# Patient Record
Sex: FEMALE | Race: WHITE | NOT HISPANIC OR LATINO | Employment: OTHER | URBAN - METROPOLITAN AREA
[De-identification: names, ages, dates, MRNs, and addresses within clinical notes are randomized per-mention and may not be internally consistent; named-entity substitution may affect disease eponyms.]

---

## 2021-07-27 ENCOUNTER — DOCUMENTATION ONLY (OUTPATIENT)
Dept: OTHER | Facility: CLINIC | Age: 76
End: 2021-07-27

## 2024-08-07 ENCOUNTER — DOCUMENTATION ONLY (OUTPATIENT)
Dept: GERIATRICS | Facility: CLINIC | Age: 79
End: 2024-08-07
Payer: COMMERCIAL

## 2024-08-08 ENCOUNTER — TRANSITIONAL CARE UNIT VISIT (OUTPATIENT)
Dept: GERIATRICS | Facility: CLINIC | Age: 79
End: 2024-08-08
Payer: COMMERCIAL

## 2024-08-08 VITALS
OXYGEN SATURATION: 94 % | BODY MASS INDEX: 33.38 KG/M2 | SYSTOLIC BLOOD PRESSURE: 161 MMHG | HEART RATE: 93 BPM | HEIGHT: 60 IN | TEMPERATURE: 97.1 F | WEIGHT: 170 LBS | RESPIRATION RATE: 18 BRPM | DIASTOLIC BLOOD PRESSURE: 79 MMHG

## 2024-08-08 DIAGNOSIS — F41.1 GENERALIZED ANXIETY DISORDER: ICD-10-CM

## 2024-08-08 DIAGNOSIS — G45.9 TIA (TRANSIENT ISCHEMIC ATTACK): ICD-10-CM

## 2024-08-08 DIAGNOSIS — N18.2 CKD (CHRONIC KIDNEY DISEASE) STAGE 2, GFR 60-89 ML/MIN: ICD-10-CM

## 2024-08-08 DIAGNOSIS — Z86.73 HISTORY OF CVA (CEREBROVASCULAR ACCIDENT): ICD-10-CM

## 2024-08-08 DIAGNOSIS — F33.42 RECURRENT MAJOR DEPRESSIVE DISORDER, IN FULL REMISSION (H): ICD-10-CM

## 2024-08-08 DIAGNOSIS — E03.9 ACQUIRED HYPOTHYROIDISM: ICD-10-CM

## 2024-08-08 DIAGNOSIS — I10 PRIMARY HYPERTENSION: ICD-10-CM

## 2024-08-08 DIAGNOSIS — U07.1 INFECTION DUE TO 2019 NOVEL CORONAVIRUS: Primary | ICD-10-CM

## 2024-08-08 DIAGNOSIS — M62.81 GENERALIZED MUSCLE WEAKNESS: ICD-10-CM

## 2024-08-08 DIAGNOSIS — R73.01 IMPAIRED FASTING GLUCOSE: ICD-10-CM

## 2024-08-08 PROCEDURE — 99309 SBSQ NF CARE MODERATE MDM 30: CPT | Performed by: NURSE PRACTITIONER

## 2024-08-08 RX ORDER — CLOPIDOGREL BISULFATE 75 MG/1
75 TABLET ORAL DAILY
COMMUNITY

## 2024-08-08 RX ORDER — ASPIRIN 81 MG/1
81 TABLET ORAL DAILY
COMMUNITY

## 2024-08-08 RX ORDER — SENNOSIDES A AND B 8.6 MG/1
1 TABLET, FILM COATED ORAL DAILY
COMMUNITY

## 2024-08-08 RX ORDER — ACETAMINOPHEN 325 MG/1
650 TABLET ORAL EVERY 6 HOURS PRN
COMMUNITY

## 2024-08-08 RX ORDER — NORTRIPTYLINE HYDROCHLORIDE 50 MG/1
50 CAPSULE ORAL AT BEDTIME
COMMUNITY

## 2024-08-08 RX ORDER — ASPIRIN 325 MG
325 TABLET, DELAYED RELEASE (ENTERIC COATED) ORAL DAILY
COMMUNITY
Start: 2024-08-29

## 2024-08-08 RX ORDER — LEVOTHYROXINE SODIUM 100 UG/1
100 TABLET ORAL DAILY
COMMUNITY

## 2024-08-08 RX ORDER — OMEPRAZOLE 40 MG/1
40 CAPSULE, DELAYED RELEASE ORAL DAILY
COMMUNITY

## 2024-08-08 RX ORDER — VENLAFAXINE HYDROCHLORIDE 150 MG/1
300 TABLET, EXTENDED RELEASE ORAL DAILY
COMMUNITY

## 2024-08-08 RX ORDER — TUBERCULIN PURIFIED PROTEIN DERIVATIVE 5 [IU]/.1ML
5 INJECTION, SOLUTION INTRADERMAL ONCE
COMMUNITY
End: 2024-08-19

## 2024-08-08 RX ORDER — ATORVASTATIN CALCIUM 40 MG/1
40 TABLET, FILM COATED ORAL AT BEDTIME
COMMUNITY

## 2024-08-08 RX ORDER — AMLODIPINE BESYLATE 5 MG/1
5 TABLET ORAL DAILY
COMMUNITY

## 2024-08-08 NOTE — PROGRESS NOTES
"                                                                              [facility]     Code Status:  FULL CODE  Visit Type: Hospital F/U     Facility:   [facility] (TCU) [[id number]]        History of Present Illness:   Hospital Admission Date: 7/28/2024   Hospital Discharge Date: 8/7/2024      Linda Cummings is a 79 year old female with a past medical history for hypothyroidisim, HTN, hx of CVA, anxiety disorder, depression and CKD.  She was recently hospitalized for encephalopathy.  She was thought to have TIA vs hypertensive urgency and metabolic encephalopathy 2/2 COVID 19.  She tested positive for COVID 19 on 7/29.  Her son (with whom she lives) stated that she tested negative on a home COVID test after 2 days of coughing and fatigue.  During hospitalization she had confusion and word finding, which cleared with conservative treatment.  She was also placed on TIA drug regimen of atorvastatin, ASA 81mg and plavix x 3 weeks.  Plan to transition to ASA 325mg and plavix after the 3 weeks.    Anxiety was an issue in the hospital and her nortriptyline was increased and she was treated with prn clonazepam as they were not able to get Rexulti (which she used at home).  Rexulti was resumed at discharge.      Today, She appears anxious and states she didn't sleep well last night as she was ruminating on the \"all the things that needed to be done\" at home. I assured her that her son was able to take care of the things at home and she needed to focus on therapy, sleep and nutrition to improve her weakness.  She denied any pain.  Some anxiety likely due to isolation which will be done tomorrow.      Past Medical History:   Diagnosis Date    Hypertension     just started med, has never been told she has HBP    Malignant neoplasm (H) 1995    lumpectomy,radiation- no trouble since    Thyroid disease 2002    started med     Past Surgical History:   Procedure Laterality Date    BIOPSY      " BREAST SURGERY      lump removed    EYE SURGERY  1964    lazy eye    GYN SURGERY          GYN SURGERY      complete hysterectomy    IR LUMBAR PUNCTURE  2017     Family History   Problem Relation Age of Onset    Depression Maternal Grandmother     Anxiety Disorder Maternal Aunt     Depression Maternal Aunt      Social History     Socioeconomic History    Marital status:      Spouse name: Not on file    Number of children: Not on file    Years of education: Not on file    Highest education level: Not on file   Occupational History    Not on file   Tobacco Use    Smoking status: Never    Smokeless tobacco: Not on file   Substance and Sexual Activity    Alcohol use: No    Drug use: No    Sexual activity: Not on file   Other Topics Concern    Parent/sibling w/ CABG, MI or angioplasty before 65F 55M? Not Asked   Social History Narrative    Not on file     Social Determinants of Health     Financial Resource Strain: Not on file   Food Insecurity: No Food Insecurity (2024)    Received from [employer]    Hunger Vital Sign     Worried About Running Out of Food in the Last Year: Never true     Ran Out of Food in the Last Year: Never true   Transportation Needs: No Transportation Needs (2024)    Received from [employer]    PRAPARE - Transportation     Lack of Transportation (Medical): No     Lack of Transportation (Non-Medical): No   Physical Activity: Not on file   Stress: Not on file   Social Connections: Not on file   Interpersonal Safety: Unknown (2024)    Received from [employer]    Humiliation, Afraid, Rape, and Kick questionnaire     Fear of Current or Ex-Partner: Not on file     Emotionally Abused: No     Physically Abused: No     Sexually Abused: No   Housing Stability: Low Risk  (2024)    Received from [employer]    Housing Stability Vital Sign     Unable to Pay for Housing in the Last Year: No     Number of Places Lived in the Last Year: 1     In  the last 12 months, was there a time when you did not have a steady place to sleep or slept in a shelter (including now)?: No       Current Outpatient Medications   Medication Sig Dispense Refill    acetaminophen (TYLENOL) 325 MG tablet Take 650 mg by mouth every 6 hours as needed for mild pain      amLODIPine (NORVASC) 5 MG tablet Take 5 mg by mouth daily      aspirin (ASA) 325 MG EC tablet Take 325 mg by mouth daily DO NOT START BEFORE 8/22/24      aspirin 81 MG EC tablet Take 81 mg by mouth daily      atorvastatin (LIPITOR) 40 MG tablet Take 40 mg by mouth at bedtime      brexpiprazole (REXULTI) 0.5 MG tablet Take 1 mg by mouth daily      clopidogrel (PLAVIX) 75 MG tablet Take 75 mg by mouth daily      EVISTA 60 MG OR TABS Take 60 mg by mouth daily      klonoPIN 0.25mg TABS Take 0.5 mg by mouth 2 times daily. AM and bedtime      levothyroxine (SYNTHROID/LEVOTHROID) 100 MCG tablet Take 100 mcg by mouth daily      nortriptyline (PAMELOR) 75 MG capsule Take 75 mg by mouth At Bedtime.      omeprazole (PRILOSEC) 40 MG DR capsule Take 40 mg by mouth daily Give an hour before a meal      QUEtiapine (SEROQUEL) 25 MG tablet Take 75 mg by mouth at bedtime      senna (SENOKOT) 8.6 MG tablet Take 1 tablet by mouth daily      TRAZODONE  mg at bedtime      tuberculin (TUBERSOL) 5 UNIT/0.1ML injection Inject 5 Units into the skin once      venlafaxine (EFFEXOR-ER) 150 MG 24 hr tablet Take 300 mg by mouth daily      CALCIUM CITRATE-MG CITRATE-D PO Take 1 tablet by mouth daily. 315-250 units      GEODON OR 1 CAPSULE TWICE DAILY      hydrOXYzine (VISTARIL) 25 MG capsule Take 25 mg by mouth 2 times daily as needed. anxiety      LAMICTAL OR 1 TABLET EVERY OTHER DAY      LEVOTHYROXINE SODIUM 100 mcg every morning (before breakfast).      LITHIUM BROMIDE None Entered      METFORMIN HCL None Entered      Multiple Vitamins-Minerals (CENTRUM SILVER PO) Take 1 tablet by mouth daily.      nadolol (CORGARD) 40 MG tablet Take 40 mg by  "mouth daily.      Omega-3 Fatty Acids (OMEGA 3 PO) Take 3 capsules by mouth daily.      REMERON OR 1 TABLET DAILY      VICODIN OR Take 5-500 mg by mouth At Bedtime. Neck and shoulder pain.       XANAX OR 1 TABLET 3 TIMES DAILY       Allergies   Allergen Reactions    Adenosine     Amoxicillin     Prednisone      Gets \"hyper\"     Immunization History   Administered Date(s) Administered    COVID-19 MONOVALENT 12+ (Pfizer) 04/24/2021, 05/15/2021         Post Discharge Medication Reconciliation Status: discharge medications reconciled, continue medications without change.    Medications list and allergies in the facility chart have been reviewed.  Please see facility EMR for most up to date list.         Review of Systems   Patient denies fever, chills, headache, lightheadedness, dizziness, rhinorrhea, cough, congestion, shortness of breath, chest pain, palpitations, abdominal pain, n/v, diarrhea, constipation, change in appetite, dysuria, frequency, burning or pain with urination.  Other than stated in HPI all other review of systems is negative.       Physical Exam  Vital signs:BP (!) 161/79   Pulse 93   Temp 97.1  F (36.2  C)   Resp 18   Ht 1.524 m (5')   Wt 77.1 kg (170 lb)   SpO2 94%   BMI 33.20 kg/m     GENERAL APPEARANCE: elderly female, anxious but in no acute distress.  HEENT: normocephalic, atraumatic   sclerae anicteric, conjunctivae clear and moist, EOM intact  LUNGS: Lung sounds CTA, no adventitious sounds, respiratory effort normal.  CARD: RRR, S1, S2, without murmurs, gallops, rubs  ABD: Soft, nondistended and nontender with normal bowel sounds.   MSK: Muscle strength and tone were equal bilaterally. Moves all extremities easily and intentionally.   EXTREMITIES: No cyanosis, clubbing or edema.  NEURO: Alert and oriented x 3.  Face is symmetric.  SKIN: Inspection of the skin reveals no rashes, ulcerations or petechiae.  PSYCH: euthymic        Labs:    Last Comprehensive Metabolic Panel:  Lab Results "   Component Value Date     06/08/2010    POTASSIUM 4.2 06/08/2010    CHLORIDE 106 06/08/2010    CO2 25 06/08/2010    ANIONGAP 6 06/08/2010    GLC 89 06/08/2010    BUN 18 06/08/2010    CR 0.83 06/08/2010    GFRESTIMATED 69 06/08/2010    SHAMA 9.8 06/08/2010       Lab Results   Component Value Date    WBC 11.4 06/08/2010     Lab Results   Component Value Date    RBC 3.96 06/08/2010     Lab Results   Component Value Date    HGB 12.5 06/08/2010     Lab Results   Component Value Date    HCT 36.5 06/08/2010     Lab Results   Component Value Date    MCV 92 06/08/2010     Lab Results   Component Value Date    MCH 31.5 06/08/2010     Lab Results   Component Value Date    MCHC 34.2 06/08/2010     Lab Results   Component Value Date    RDW 13.6 06/08/2010     Lab Results   Component Value Date     06/08/2010           Assessment/plan:   Infection due to 2019 novel coronavirus  No current symptoms or medications for treatment.  Isolation to be done tomorrow.  Monitor for residual symptoms    TIA (transient ischemic attack)  Continue with Atorvastatin, ASA 81, plavix x 3 weeks than after 8/22 will change to ASA 325mg and plavix.  Already on omeprazole for stomach protection.     Generalized muscle weakness  PT/OT eval and treat    Generalized anxiety disorder  Severe, continue with home Klonopin, Seroquel, trazodone, Venlafaxine and Rexulti.  May need to add prn Klonopin while in TCU if continues to have rumination issues.  Monitor.  Okay for ACP to see. Counseled patient on techniques to switch from limbic to executive function and square breathing.     Recurrent major depressive disorder, in full remission (H24)  Stable, ACP to see.  Continue with venlafaxine and Rexulti    Acquired hypothyroidism  Cotninue with home Levothyroxine    Primary hypertension  Mildly elevated BP today, monitor and continue with home amlodipine.      CKD (chronic kidney disease) stage 2, GFR 60-89 ml/min  Monitor as needed.  Avoid  nephrotoxins    History of CVA (cerebrovascular accident)  No residual, on ASA, plavix and atorvastatin    Impaired fasting glucose  Hospital glucose was 89, monitor for symptoms of hyperglycemia.     35 total minutes spent reviewing medical records, labs and medications, counseling patient on above plan of care.     Electronically signed by: Munira Guajardo NP

## 2024-08-10 DIAGNOSIS — F41.1 GENERALIZED ANXIETY DISORDER: Primary | ICD-10-CM

## 2024-08-10 RX ORDER — CLONAZEPAM 0.5 MG/1
0.5 TABLET ORAL 2 TIMES DAILY
Qty: 6 TABLET | Refills: 0 | Status: SHIPPED | OUTPATIENT
Start: 2024-08-10 | End: 2024-08-13

## 2024-08-12 ENCOUNTER — TRANSITIONAL CARE UNIT VISIT (OUTPATIENT)
Dept: GERIATRICS | Facility: CLINIC | Age: 79
End: 2024-08-12
Payer: COMMERCIAL

## 2024-08-12 VITALS
HEART RATE: 98 BPM | OXYGEN SATURATION: 94 % | RESPIRATION RATE: 18 BRPM | HEIGHT: 60 IN | SYSTOLIC BLOOD PRESSURE: 158 MMHG | BODY MASS INDEX: 34.28 KG/M2 | WEIGHT: 174.6 LBS | DIASTOLIC BLOOD PRESSURE: 89 MMHG | TEMPERATURE: 97 F

## 2024-08-12 DIAGNOSIS — I10 PRIMARY HYPERTENSION: ICD-10-CM

## 2024-08-12 DIAGNOSIS — R73.01 IMPAIRED FASTING GLUCOSE: ICD-10-CM

## 2024-08-12 DIAGNOSIS — M62.81 GENERALIZED MUSCLE WEAKNESS: ICD-10-CM

## 2024-08-12 DIAGNOSIS — E03.9 ACQUIRED HYPOTHYROIDISM: ICD-10-CM

## 2024-08-12 DIAGNOSIS — Z86.73 HISTORY OF CVA (CEREBROVASCULAR ACCIDENT): ICD-10-CM

## 2024-08-12 DIAGNOSIS — F41.1 GENERALIZED ANXIETY DISORDER: ICD-10-CM

## 2024-08-12 DIAGNOSIS — G45.9 TIA (TRANSIENT ISCHEMIC ATTACK): ICD-10-CM

## 2024-08-12 DIAGNOSIS — N18.2 CKD (CHRONIC KIDNEY DISEASE) STAGE 2, GFR 60-89 ML/MIN: ICD-10-CM

## 2024-08-12 DIAGNOSIS — F33.42 RECURRENT MAJOR DEPRESSIVE DISORDER, IN FULL REMISSION (H): ICD-10-CM

## 2024-08-12 DIAGNOSIS — U07.1 INFECTION DUE TO 2019 NOVEL CORONAVIRUS: Primary | ICD-10-CM

## 2024-08-12 PROCEDURE — 99309 SBSQ NF CARE MODERATE MDM 30: CPT | Performed by: NURSE PRACTITIONER

## 2024-08-12 NOTE — PROGRESS NOTES
[facility] GERIATRICS    Visit Type: RECHECK     Facility:   [facility] (U) [[id number]]         History of Present Illness: Linda Cummings is a 79 year old female     Her past medical history includes  Encephalopathy  Hypothyroidism  HTN  hx of CVA  anxiety disorder  Depression  CKD.     She was recently hospitalized for encephalopathy. She was thought to have TIA vs hypertensive urgency and metabolic encephalopathy 2/2 COVID 19.   During hospitalization she had confusion and word finding, which cleared with conservative treatment. She was also placed on TIA drug regimen of atorvastatin, ASA 81mg and plavix x 3 weeks. Plan to transition to ASA 325mg and plavix after the 3 weeks.   Anxiety was an issue in the hospital and her nortriptyline was increased and she was treated with prn clonazepam as they were not able to get Rexulti (which she used at home). Rexulti was resumed at discharge.     Today she doesn't have complaints.  Thinks she is at baseline and is ready to go home  Bp and HR slightly elevated  Per nursing, Continent of Bowel and frequently incontinent of bladder needs assistance of 1 with toileting.    Current Outpatient Medications   Medication Sig Dispense Refill    acetaminophen (TYLENOL) 325 MG tablet Take 650 mg by mouth every 6 hours as needed for mild pain      amLODIPine (NORVASC) 5 MG tablet Take 5 mg by mouth daily      aspirin (ASA) 325 MG EC tablet Take 325 mg by mouth daily DO NOT START BEFORE 8/22/24      aspirin 81 MG EC tablet Take 81 mg by mouth daily      atorvastatin (LIPITOR) 40 MG tablet Take 40 mg by mouth at bedtime      brexpiprazole (REXULTI) 0.5 MG tablet Take 1 mg by mouth daily      clonazePAM (KLONOPIN) 0.5 MG tablet Take 1 tablet (0.5 mg) by mouth 2 times daily 6 tablet 0    clopidogrel (PLAVIX) 75 MG tablet Take 75 mg by mouth daily      EVISTA 60 MG OR TABS Take 60 mg by mouth daily      klonoPIN 0.25mg TABS Take 0.5 mg by mouth 2 times daily. AM and  bedtime      levothyroxine (SYNTHROID/LEVOTHROID) 100 MCG tablet Take 100 mcg by mouth daily      nortriptyline (PAMELOR) 50 MG capsule Take 50 mg by mouth at bedtime      omeprazole (PRILOSEC) 40 MG DR capsule Take 40 mg by mouth daily Give an hour before a meal      QUEtiapine (SEROQUEL) 25 MG tablet Take 75 mg by mouth at bedtime      senna (SENOKOT) 8.6 MG tablet Take 1 tablet by mouth daily      TRAZODONE  mg at bedtime      tuberculin (TUBERSOL) 5 UNIT/0.1ML injection Inject 5 Units into the skin once      venlafaxine (EFFEXOR-ER) 150 MG 24 hr tablet Take 300 mg by mouth daily         ALLERGIES:Adenosine, Amoxicillin, and Prednisone    Vitals:  BP (!) 158/89   Pulse 98   Temp 97  F (36.1  C)   Resp 18   Ht 1.524 m (5')   Wt 79.2 kg (174 lb 9.6 oz)   SpO2 94%   BMI 34.10 kg/m    Body mass index is 34.1 kg/m .    Review of Systems   All other systems reviewed and are negative.         Physical Exam  Vitals and nursing note reviewed.   Cardiovascular:      Rate and Rhythm: Normal rate.   Pulmonary:      Effort: Pulmonary effort is normal.   Neurological:      General: No focal deficit present.      Mental Status: She is alert.           Labs:     Labs reviewed in EPIC       Assessment/Plan:  Infection due to 2019 novel coronavirus  resolved  No current symptoms or medications for treatment.   Monitor for residual symptoms     TIA (transient ischemic attack)  Continue with Atorvastatin, ASA 81, plavix x 3 weeks than after 8/22 will change to ASA 325mg and plavix.    omeprazole for stomach protection.      Generalized muscle weakness  PT/OT eval and treat     Generalized anxiety disorder  Severe, continue with home Klonopin, Seroquel, trazodone, Venlafaxine and Rexulti.  May need to add prn Klonopin while in TCU if continues to have rumination issues.  Monitor.  Okay for ACP to see.         Recurrent major depressive disorder, in full remission (H24)  Stable, ACP to see.  Continue with venlafaxine and  Rexulti     Acquired hypothyroidism  Cotninue with home Levothyroxine     Primary hypertension  chronic, monitor and continue with home amlodipine.       CKD (chronic kidney disease) stage 2, GFR 60-89 ml/min  Monitor as needed.    Avoid nephrotoxins     History of CVA (cerebrovascular accident)  No residual, on ASA, plavix and atorvastatin     Impaired fasting glucose  Hospital glucose was 89, monitor for symptoms of hyperglycemia.         Electronically signed by:    HOANG Lino CNP   MEDICATIONS:  MED REC REQUIRED  Post Medication Reconciliation Status:  Medication reconciliation previously completed during another office visit

## 2024-08-13 DIAGNOSIS — F41.1 GENERALIZED ANXIETY DISORDER: ICD-10-CM

## 2024-08-13 RX ORDER — CLONAZEPAM 0.5 MG/1
0.5 TABLET ORAL 2 TIMES DAILY
Qty: 60 TABLET | Refills: 0 | Status: SHIPPED | OUTPATIENT
Start: 2024-08-13

## 2024-08-14 ENCOUNTER — LAB REQUISITION (OUTPATIENT)
Dept: LAB | Facility: CLINIC | Age: 79
End: 2024-08-14
Payer: COMMERCIAL

## 2024-08-14 DIAGNOSIS — I63.9 CEREBRAL INFARCTION, UNSPECIFIED (H): ICD-10-CM

## 2024-08-15 LAB
ANION GAP SERPL CALCULATED.3IONS-SCNC: 10 MMOL/L (ref 7–15)
BUN SERPL-MCNC: 11.2 MG/DL (ref 8–23)
CALCIUM SERPL-MCNC: 9.4 MG/DL (ref 8.8–10.4)
CHLORIDE SERPL-SCNC: 106 MMOL/L (ref 98–107)
CREAT SERPL-MCNC: 0.89 MG/DL (ref 0.51–0.95)
EGFRCR SERPLBLD CKD-EPI 2021: 66 ML/MIN/1.73M2
ERYTHROCYTE [DISTWIDTH] IN BLOOD BY AUTOMATED COUNT: 13.6 % (ref 10–15)
GLUCOSE SERPL-MCNC: 103 MG/DL (ref 70–99)
HCO3 SERPL-SCNC: 26 MMOL/L (ref 22–29)
HCT VFR BLD AUTO: 42.3 % (ref 35–47)
HGB BLD-MCNC: 13.3 G/DL (ref 11.7–15.7)
MCH RBC QN AUTO: 27.2 PG (ref 26.5–33)
MCHC RBC AUTO-ENTMCNC: 31.4 G/DL (ref 31.5–36.5)
MCV RBC AUTO: 87 FL (ref 78–100)
PLATELET # BLD AUTO: 255 10E3/UL (ref 150–450)
POTASSIUM SERPL-SCNC: 4 MMOL/L (ref 3.4–5.3)
RBC # BLD AUTO: 4.89 10E6/UL (ref 3.8–5.2)
SODIUM SERPL-SCNC: 142 MMOL/L (ref 135–145)
WBC # BLD AUTO: 7.1 10E3/UL (ref 4–11)

## 2024-08-15 PROCEDURE — 80048 BASIC METABOLIC PNL TOTAL CA: CPT | Mod: ORL | Performed by: NURSE PRACTITIONER

## 2024-08-15 PROCEDURE — P9604 ONE-WAY ALLOW PRORATED TRIP: HCPCS | Mod: ORL | Performed by: NURSE PRACTITIONER

## 2024-08-15 PROCEDURE — 36415 COLL VENOUS BLD VENIPUNCTURE: CPT | Mod: ORL | Performed by: NURSE PRACTITIONER

## 2024-08-15 PROCEDURE — 85027 COMPLETE CBC AUTOMATED: CPT | Mod: ORL | Performed by: NURSE PRACTITIONER

## 2024-08-18 NOTE — PROGRESS NOTES
[facility] GERIATRICS DISCHARGE SUMMARY  PATIENT'S NAME: Linda Cummings  YOB: 1945  MEDICAL RECORD NUMBER:  [id number]  Place of Service where encounter took place:  [facility] (U) [[id number]]    PRIMARY CARE PROVIDER AND CLINIC RESPONSIBLE AFTER TRANSFER:   Physician No Ref-Primary, No address on file    [facility] Provider     Transferring providers: Angeline BOLTON CNP; Jo Ann Rodriguez MD    Recent Hospitalization/ED:  [facility]  stay 7/28/24 to 8/7/24.  Date of SNF Admission: August 07, 2024  Date of SNF (anticipated) Discharge:  8/24/2024  Discharged to: previous independent home  Cognitive Scores: SLUMS: 27/30  Physical Function:  Patient requires MIN A x1 with bathing, and lower body dressing. CGA with upper body dressing, toileting, ambulation using a 2WW, bed mobility and transfer. SBA with grooming and oral care. Patient is IND with feeding after set-up.   Continent of both Bowel & Bladder.  DME: No DME needed    CODE STATUS/ADVANCE DIRECTIVES DISCUSSION:  FULL CODE    ALLERGIES: Adenosine, Amoxicillin, and Prednisone    Her past medical history includes  Encephalopathy  Hypothyroidism  HTN  hx of CVA  anxiety disorder  Depression  CKD.      She was recently hospitalized for encephalopathy. She was thought to have TIA vs hypertensive urgency and metabolic encephalopathy 2/2 COVID 19.   During hospitalization she had confusion and word finding, which cleared with conservative treatment. She was also placed on TIA drug regimen of atorvastatin, ASA 81mg and plavix x 3 weeks. Plan to transition to ASA 325mg and plavix after the 3 weeks.   Anxiety was an issue in the hospital and her nortriptyline was increased and she was treated with prn clonazepam as they were not able to get Rexulti (which she used at home). Rexulti was resumed at discharge    NURSING FACILITY COURSE   Medication Changes/Rationale:   Summary of nursing facility stay:   Infection due to  2019 novel coronavirus  resolved  No current symptoms or medications for treatment.   Monitor for residual symptoms     TIA (transient ischemic attack)  Continue with Atorvastatin, ASA 81, plavix x 3 weeks than after 8/22 will change to ASA 325mg and plavix.    omeprazole for stomach protection.      Generalized muscle weakness  Per therapy, Pt performed functional ambulation 250ft x 2 and 50ft x 6 [employer]ig [employer]ig  through [place] and stopping to throw stuffed animals at a target with the 2WW wearing a Transfer Belt with CGA      Generalized anxiety disorder  Severe, continue with home Klonopin, Seroquel, trazodone, Venlafaxine and Rexulti.  May need to add prn Klonopin while in TCU if continues to have rumination issues.         Recurrent major depressive disorder, in full remission (H24)  Saw ACP in TCU  Continue with venlafaxine and Rexulti     Acquired hypothyroidism  Continue with home Levothyroxine     Primary hypertension  chronic, monitor and continue with home amlodipine.       CKD (chronic kidney disease) stage 2, GFR 60-89 ml/min  Monitor as needed.    Avoid nephrotoxins     History of CVA (cerebrovascular accident)  Has asphasia - following with SLP  No residual, on ASA, plavix and atorvastatin     Impaired fasting glucose  Hospital glucose was 89, monitor for symptoms of hyperglycemia.     Discharge Medications:  MED REC REQUIRED  Post Medication Reconciliation Status:  Discharge medications reconciled and changed, see notes/orders       Current Outpatient Medications   Medication Sig Dispense Refill    acetaminophen (TYLENOL) 325 MG tablet Take 650 mg by mouth every 6 hours as needed for mild pain      amLODIPine (NORVASC) 5 MG tablet Take 5 mg by mouth daily      aspirin 81 MG EC tablet Take 81 mg by mouth daily      atorvastatin (LIPITOR) 40 MG tablet Take 40 mg by mouth at bedtime      brexpiprazole (REXULTI) 0.5 MG tablet Take 1 mg by mouth daily      clonazePAM (KLONOPIN) 0.5 MG tablet Take 1 tablet (0.5  mg) by mouth 2 times daily 60 tablet 0    clopidogrel (PLAVIX) 75 MG tablet Take 75 mg by mouth daily      EVISTA 60 MG OR TABS Take 60 mg by mouth daily      klonoPIN 0.25mg TABS Take 0.5 mg by mouth 2 times daily. AM and bedtime      levothyroxine (SYNTHROID/LEVOTHROID) 100 MCG tablet Take 100 mcg by mouth daily      nortriptyline (PAMELOR) 50 MG capsule Take 50 mg by mouth at bedtime      omeprazole (PRILOSEC) 40 MG DR capsule Take 40 mg by mouth daily Give an hour before a meal      QUEtiapine (SEROQUEL) 25 MG tablet Take 75 mg by mouth at bedtime      senna (SENOKOT) 8.6 MG tablet Take 1 tablet by mouth daily      TRAZODONE  mg at bedtime      venlafaxine (EFFEXOR-ER) 150 MG 24 hr tablet Take 300 mg by mouth daily      [START ON 8/29/2024] aspirin (ASA) 325 MG EC tablet Take 325 mg by mouth daily DO NOT START BEFORE 8/22/24 (Patient not taking: Reported on 8/19/2024)            Controlled medications:   not applicable/none     Past Medical History:   Past Medical History:   Diagnosis Date    Hypertension     just started med, has never been told she has HBP    Malignant neoplasm (H) 1995    lumpectomy,radiation- no trouble since    Thyroid disease 2002    started med     Physical Exam:   Vitals: BP (!) 176/86   Pulse 99   Temp 97.1  F (36.2  C)   Resp 18   Ht 1.524 m (5')   Wt 78.7 kg (173 lb 8 oz)   SpO2 93%   BMI 33.88 kg/m    BMI: Body mass index is 33.88 kg/m .  GENERAL APPEARANCE:  Alert  RESP:  lungs clear to auscultation , no respiratory distress  CV:  rate-normal  PSYCH:  affect abnormal flat      SNF labs: Most Recent 3 CBC's:  Recent Labs   Lab Test 08/15/24  0728   WBC 7.1   HGB 13.3   MCV 87        Most Recent 3 BMP's:  Recent Labs   Lab Test 08/15/24  0728      POTASSIUM 4.0   CHLORIDE 106   CO2 26   BUN 11.2   CR 0.89   ANIONGAP 10   SHAMA 9.4   *       DISCHARGE PLAN:  Follow up labs: No labs orders/due  Medical Follow Up:      Follow up with primary care provider  in 1-2 weeks  [facility] scheduled appointments: none  Discharge Services: Home Care:  Occupational Therapy, Physical Therapy, and Speech Therapy     TOTAL DISCHARGE TIME:   Greater than 30 minutes  Electronically signed by:      HOANG Lino CNP on 8/19/2024 at 2:40 PM      Documentation of Face to Face and Certification for Home Health Services    I certify that patient: Linda Cummings is under my care and that I, or a nurse practitioner or physician's assistant working with me, had a face-to-face encounter that meets the physician face-to-face encounter requirements with this patient on: 8/19/2024.    This encounter with the patient was in whole, or in part, for the following medical condition, which is the primary reason for home health care:        Infection due to 2019 novel coronavirus  TIA (transient ischemic attack)  Generalized muscle weakness  Recurrent major depressive disorder, in full remission (H24)  Generalized anxiety disorder  Acquired hypothyroidism  Primary hypertension  History of CVA (cerebrovascular accident)  CKD (chronic kidney disease) stage 2, GFR 60-89 ml/min  Impaired fasting glucose  Aphasia      I certify that, based on my findings, the following services are medically necessary home health services: Occupational Therapy, Physical Therapy, and Speech Language Therapy.    My clinical findings support the need for the above services because: Occupational Therapy Services are needed to assess and treat cognitive ability and address ADL safety due to impairment in ADLS, mobility, increase risk of falls, aphasia., Physical Therapy Services are needed to assess and treat the following functional impairments: ADLS, mobility, increase risk of falls, aphasia., and Speech Therapy Services are needed to assess and treat impairments in language and/or swallow functions due to ADLS, mobility, increase risk of falls, aphasia.    Further, I certify that my clinical findings support  that this patient is homebound (i.e. absences from home require considerable and taxing effort and are for medical reasons or [facility] or infrequently or of short duration when for other reasons) because: Mental health symptoms including: Mental health condition is exacerbated by exposure to crowds, unfamiliar environment or new stressful situations...    Based on the above findings. I certify that this patient is confined to the home and needs intermittent skilled nursing care, physical therapy and/or speech therapy.  The patient is under my care, and I have initiated the establishment of the plan of care.  This patient will be followed by a physician who will periodically review the plan of care.  Physician/Provider to provide follow up care: No Ref-Primary, Physician    Attending hospital physician (the Medicare certified [facility] provider): HOANG Lino CNP  Physician Signature: See electronic signature associated with these discharge orders.  Date: 8/19/2024

## 2024-08-19 ENCOUNTER — DISCHARGE SUMMARY NURSING HOME (OUTPATIENT)
Dept: GERIATRICS | Facility: CLINIC | Age: 79
End: 2024-08-19
Payer: COMMERCIAL

## 2024-08-19 VITALS
DIASTOLIC BLOOD PRESSURE: 86 MMHG | BODY MASS INDEX: 34.06 KG/M2 | WEIGHT: 173.5 LBS | RESPIRATION RATE: 18 BRPM | HEART RATE: 99 BPM | TEMPERATURE: 97.1 F | HEIGHT: 60 IN | SYSTOLIC BLOOD PRESSURE: 176 MMHG | OXYGEN SATURATION: 93 %

## 2024-08-19 DIAGNOSIS — F33.42 RECURRENT MAJOR DEPRESSIVE DISORDER, IN FULL REMISSION (H): ICD-10-CM

## 2024-08-19 DIAGNOSIS — Z86.73 HISTORY OF CVA (CEREBROVASCULAR ACCIDENT): ICD-10-CM

## 2024-08-19 DIAGNOSIS — G45.9 TIA (TRANSIENT ISCHEMIC ATTACK): ICD-10-CM

## 2024-08-19 DIAGNOSIS — R73.01 IMPAIRED FASTING GLUCOSE: ICD-10-CM

## 2024-08-19 DIAGNOSIS — E03.9 ACQUIRED HYPOTHYROIDISM: ICD-10-CM

## 2024-08-19 DIAGNOSIS — F41.1 GENERALIZED ANXIETY DISORDER: ICD-10-CM

## 2024-08-19 DIAGNOSIS — N18.2 CKD (CHRONIC KIDNEY DISEASE) STAGE 2, GFR 60-89 ML/MIN: ICD-10-CM

## 2024-08-19 DIAGNOSIS — M62.81 GENERALIZED MUSCLE WEAKNESS: Primary | ICD-10-CM

## 2024-08-19 DIAGNOSIS — U07.1 INFECTION DUE TO 2019 NOVEL CORONAVIRUS: ICD-10-CM

## 2024-08-19 DIAGNOSIS — I10 PRIMARY HYPERTENSION: ICD-10-CM

## 2024-08-19 DIAGNOSIS — R47.01 APHASIA: ICD-10-CM

## 2024-08-19 PROCEDURE — 99316 NF DSCHRG MGMT 30 MIN+: CPT | Performed by: NURSE PRACTITIONER

## 2024-09-16 ENCOUNTER — HOSPITAL ENCOUNTER (EMERGENCY)
Facility: CLINIC | Age: 79
Discharge: HOME | End: 2024-09-16
Attending: EMERGENCY MEDICINE | Admitting: EMERGENCY MEDICINE
Payer: COMMERCIAL

## 2024-09-16 VITALS
TEMPERATURE: 98 F | HEART RATE: 78 BPM | OXYGEN SATURATION: 94 % | RESPIRATION RATE: 24 BRPM | DIASTOLIC BLOOD PRESSURE: 91 MMHG | SYSTOLIC BLOOD PRESSURE: 128 MMHG

## 2024-09-16 DIAGNOSIS — F41.8 DEPRESSION WITH ANXIETY: ICD-10-CM

## 2024-09-16 PROBLEM — F32.A DEPRESSION: Status: ACTIVE | Noted: 2024-09-16

## 2024-09-16 PROCEDURE — 250N000013 HC RX MED GY IP 250 OP 250 PS 637: Performed by: EMERGENCY MEDICINE

## 2024-09-16 PROCEDURE — 99283 EMERGENCY DEPT VISIT LOW MDM: CPT

## 2024-09-16 RX ORDER — LORAZEPAM 0.5 MG/1
0.5 TABLET ORAL ONCE
Status: COMPLETED | OUTPATIENT
Start: 2024-09-16 | End: 2024-09-16

## 2024-09-16 RX ADMIN — LORAZEPAM 0.5 MG: 0.5 TABLET ORAL at 15:15

## 2024-09-16 ASSESSMENT — ACTIVITIES OF DAILY LIVING (ADL)
ADLS_ACUITY_SCORE: 35

## 2024-09-16 NOTE — ED TRIAGE NOTES
Pt has had sudden life changes that have caused depression and anxiety   Pt just lost a personal care attendant recently

## 2024-09-16 NOTE — ED PROVIDER NOTES
"  Emergency Department Note      History of Present Illness     Chief Complaint   Depression and Anxiety      [facility]   Linda Cummings is a 79 year old female presenting to the emergency department for a psychiatric evaluation. The patient's son reports that the patient's  recently broke her leg and is unable to assist her for 6-8 weeks. This has sent the patient into a \"downward spiral.\" The patient denies any suicidal ideation, chest pain, cough, cold-like symptoms, or rhinorrhea. The patient reports she has been taking her mental health medication as prescribed.     Independent Historian   The patient's son as detailed above in the HPI.     Review of External Notes   I reviewed her emergency room visit from yesterday    Past Medical History     Medical History and Problem List   Hypertension   Depression  Hypothyroidism   Anxiety     Medications   Norvasc   Aspirin   Lipitor  Rexulti   Klonopin  Plavix   Evista  Levothyroxine   Pamelor  Prilosec  Seroquel  Senokot  Trazodone   Venlafaxine     Surgical History   Biopsy   Breast surgery  Eye surgery    Complete hysterectomy   IR lumbar puncture     Physical Exam     Patient Vitals for the past 24 hrs:   BP Temp Temp src Pulse Resp SpO2   24 1453 (!) 128/91 98  F (36.7  C) Temporal 78 24 94 %     Physical Exam  Constitutional: Vital signs reviewed.  Pleasant. Anxious and tearful.   HEENT: Moist mucous membranes  Cardiovascular: Regular rate and rhythm  Pulmonary/Chest: Breathing comfortably on room air.  No audible wheezing  Musculoskeletal/Extremities: No bony deformities.  Moves all 4 extremities without difficulty.  Neurological: Alert.  No focal deficits.  Endo: No pitting edema  Skin: No visible rash.  Psychiatric: Pleasant. Denies SI or HI.         Diagnostics     Lab Results   Labs Ordered and Resulted from Time of ED Arrival to Time of ED Departure - No data to display    Imaging   No orders to display       EKG "   None    Independent Interpretation   None    ED Course      Medications Administered   Medications   LORazepam (ATIVAN) tablet 0.5 mg (0.5 mg Oral $Given 9/16/24 6309)       Procedures   Procedures     Discussion of Management   None    ED Course   ED Course as of 09/16/24 2018   Mon Sep 16, 2024   0634 I obtained history and examined the patient as noted above.    2002 I spoke to DEC regarding the patient.       Additional Documentation  None    Medical Decision Making / Diagnosis     CMS Diagnoses: None    MIPS       None    MDM   Linda Cummings is a 79 year old female who presents to the emergency department with anxiety and depression.  She states has been dealing with a life event.  Her PCA broke her leg and she normally attends her 3 times a week.  They now found out that she will be able to help her for at least 8 weeks.  This is triggered her anxiety and depression.  She is not suicidal or homicidal.  She is very tearful.  I did agree to give her half a milligram of Ativan here.  We discussed [facility] and they agreed.  She will brought over there once a bed available.    Addendum: Patient was initially going to go to [facility] however she did not want to stay the night in the hospital to get treatment.  As such the DEC  graciously agreed to evaluate her in the [facility] family room so that he could do his full assessment and we can discharge her home.  We both feel that she is safe to discharge home.  He was able to set her up with outpatient appointments.  And should be discharged to home as per her preference.    Disposition   The patient was discharged to home    Diagnosis     ICD-10-CM    1. Depression with anxiety  F41.8            Discharge Medications   New Prescriptions    No medications on file         Scribe Disclosure:  I, Jose C Flynn, am serving as a scribe at 3:12 PM on 9/16/2024 to document services personally performed by Yordan Coburn MD based on my observations and the provider's  statements to me.        Yordan Coburn MD  09/16/24 1528       Yordan Coburn MD  09/16/24 2018

## 2024-09-16 NOTE — ED NOTES
[facility]  ED to EMPATH Checklist:      Goal for EMPATH: Depression management, Anxiety management, and  Referral and resources    Current Behavior: Anxious and Sad    Safety Concerns: None    Legal Hold Status: Voluntary    Medically Cleared by ED provider: Yes    Patient Therapeutically Searched: Therapeutic search by ED staff (strings, belts, shoes, pockets, electronics, etc.)    Belongings: Remain with patient    Independent Ambulation at Baseline: Yes/No: Yes    Participates in Care/Conversation: Yes/No: Yes    Patient Informed about EMPATH: Yes/No: Yes    DEC: Ordered and pending    Patient Ready to be Transferred to EMPATH? Yes/No: Yes

## 2024-09-17 ENCOUNTER — TELEPHONE (OUTPATIENT)
Dept: BEHAVIORAL HEALTH | Facility: CLINIC | Age: 79
End: 2024-09-17
Payer: COMMERCIAL

## 2024-09-17 NOTE — TELEPHONE ENCOUNTER
Teams referral from Bhavani Alston 9/16 at 8:07pm:   Ruby DE LEON, MRN: [id number] needs a TC appt for therapy, please! Pt has a therapy appointment scheduled on 10/1/24. They're retired so their schedule should be generally open. They would like a female, virtual, mid-day appt.  Phone number is [phone], they're on Medicare and could use a social work consult as well. Thank you!    Priya Hernandez  Transition Clinic Coordinator  09/17/24 7:07 AM

## 2024-09-17 NOTE — TELEPHONE ENCOUNTER
Writer spoke with pts son on que and sent an email with clinics that take  pts health insurance/resources as requested.    Ly Huggins  09/17/2024  200

## 2024-09-17 NOTE — ED NOTES
Discharge instructions reviewed with patient including follow-up care plan. Educated on medication regime and advised not to stop prescribed medication without consulting their physician. Reviewed safety plan and outpatient resources/appointments.Verbalized understanding of discharge instructions. Denies SI. All belongings which where brought into the hospital have been returned to patient. Escorted off the unit @ 2032. Discharged to self.

## 2024-09-17 NOTE — ED NOTES
Spoke with pt to bring pt back to [facility]. T was explained th process of coming to [facility] and what service fes we could offer. Pt was able to discusses her reasoning for coming to [facility] however was not willing to wait to meet with a provider. Pt was willing to talk with a therapist to discuss OP therapy referrals to gain more OP supports. Pt states that she has a psychiatrist and has seen them recently in the past few weeks. Pt does have a follow up appointment later this week to further discuss medications. Pt and son were able to agree to meeting with [facility] to discuss further OP services that could be provided. Plan to have [facility] complete assessment to received services.

## 2024-09-17 NOTE — CONSULTS
Diagnostic Evaluation Consultation  Crisis Assessment    Patient Name: Linda Cummings  Age:  79 year old  Legal Sex: female  Gender Identity: female  Pronouns: she/her  Race: White  Ethnicity: Not  or   Language: English      Patient was assessed: In person   Crisis Assessment Start Date: 09/16/24  Crisis Assessment Start Time: 1905  Crisis Assessment Stop Time: 1950  Patient location: [facility] EMERGENCY DEPT                             Off The Floor    Referral Data and Chief Complaint  Linda Cummings presents to the ED with family/friends. Patient is presenting to the ED for the following concerns: Depression, Anxiety, Worsening psychosocial stress.   Factors that make the mental health crisis life threatening or complex are:  Pt presents to ED with son due to concerns from increased depression and anxiety that are influenced by worsening psychosocial stress. Pt denies SI, SIB and HI. Pt reports passive thoughts of death that she describes as acceptance of death. Pt denies AVH and other psychotic sx.    Informed Consent and Assessment Methods  Explained the crisis assessment process, including applicable information disclosures and limits to confidentiality, assessed understanding of the process, and obtained consent to proceed with the assessment.  Assessment methods included conducting a formal interview with patient, review of medical records, collaboration with medical staff, and obtaining relevant collateral information from family and community providers when available.  : done     Patient response to interventions: eager to participate        History of the Crisis   Pt is a 79 year old female that presents for DEC assessment as calm and cooperative. Pt presents to ED with son due to concerns from increased depression and anxiety that are influenced by worsening psychosocial stress. Pt denies SI, SIB and HI. Pt denies hx of suicidal behavior. Pt reports passive thoughts  "of death that she describes as acceptance of death \"if God wants to take me.\" Pt denies AVH and other psychotic sx. Pt reports prior dx of depression and anxiety. Pt reports suddenly losing her PCA support (3 hours 3 days per week) a week ago because PCA was injured; the agency has not contacted pt to provide substitute PCA care. Since losing PCA support pt reports experiencing a mental health spiral with worsening depression and anxiety. Pt endorses MH sx of low mood, fatigue, racing thoughts, self blame, avoidance, and social withdrawal. Pt is followed by OP psychiatrist and is currently prescribed seroquel, klonopin, venaflaxine, rexulti, and trazadone. Pt is not followed by OP therapist and has requested referral. Pt reports prior IP MH hospitalization most recently in 2010. Pt denies substance use concerns and hx.    Brief Psychosocial History  Family:  , Children yes  Support System:  Children, Friend  Employment Status:  retired  Source of Income:  social security  Financial Environmental Concerns:  none  Current Hobbies:  music, interaction with pets  Barriers in Personal Life:  emotional concerns    Significant Clinical History  Current Anxiety Symptoms:  anxious, excessive worry, racing thoughts  Current Depression/Trauma:  withdrawl/isolation, negativistic, avoidance, apathy, helplessness, hopelessness, sadness, excessive guilt, thoughts of death/suicide  Current Somatic Symptoms:  anxious, racing thoughts, excessive worry  Current Psychosis/Thought Disturbance:     Current Eating Symptoms:     Chemical Use History:  Alcohol: None  Benzodiazepines: None  Opiates: None  Cocaine: None  Marijuana: None  Other Use: None   Past diagnosis:  Anxiety Disorder, Depression  Family history:  Anxiety Disorder, Depression  Past treatment:  Individual therapy, Psychiatric Medication Management, Inpatient Hospitalization  Details of most recent treatment:  Pt is currently followed by OP psychiatrist  Other " relevant history:          Collateral Information  Is there collateral information: Yes     Collateral information name, relationship, phone number:  Kenneth, son, #[phone]    What happened today: Pt had mental health spiral after losing PCA support last week with increasing depression and anxiety     What is different about patient's functioning: Pt has hx of mental health with periods of ups and downs.     Concern about alcohol/drug use:  No    What do you think the patient needs: More community support      Has patient made comments about wanting to kill themselves/others: no    If d/c is recommended, can they take part in safety/aftercare planning:  yes    Additional collateral information:   N/A     Risk Assessment  [place] Suicide Severity Rating Scale Full Clinical Version:  Suicidal Ideation  Q1 Wish to be Dead (Lifetime): No  Q2 Non-Specific Active Suicidal Thoughts (Lifetime): No  Q6 Suicide Behavior (Lifetime): no     Suicidal Behavior (Lifetime)  Actual Attempt (Lifetime): No  Has subject engaged in non-suicidal self-injurious behavior? (Lifetime): No  Interrupted Attempts (Lifetime): No  Aborted or Self-Interrupted Attempt (Lifetime): No  Preparatory Acts or Behavior (Lifetime): No    [place] Suicide Severity Rating Scale Recent:   Suicidal Ideation (Recent)  Q1 Wished to be Dead (Past Month): no  Q2 Suicidal Thoughts (Past Month): no  Level of Risk per Screen: no risks indicated          Environmental or Psychosocial Events: geographic isolation from supports, helplessness/hopelessness  Protective Factors: Protective Factors: strong bond to family unit, community support, or employment, responsibilities and duties to others, including pets and children, help seeking, cultural, spiritual , or Rastafari beliefs associated with meaning and value in life    Does the patient have thoughts of harming others? Feels Like Hurting Others: no  Previous Attempt to Hurt Others: no  Is the patient engaging in  sexually inappropriate behavior?: no    Is the patient engaging in sexually inappropriate behavior?  no        Mental Status Exam   Affect: Appropriate  Appearance: Appropriate  Attention Span/Concentration: Attentive  Eye Contact: Engaged    Fund of Knowledge: Delayed   Language /Speech Content: Fluent  Language /Speech Volume: Normal  Language /Speech Rate/Productions: Normal  Recent Memory: Intact  Remote Memory: Intact  Mood: Anxious, Depressed  Orientation to Person: Yes   Orientation to Place: Yes  Orientation to Time of Day: Yes  Orientation to Date: Yes     Situation (Do they understand why they are here?): Yes  Psychomotor Behavior: Normal  Thought Content: Clear  Thought Form: Intact         Medication  Psychotropic medications:   Medication Orders - Psychiatric (From admission, onward)      None             Current Care Team  Patient Care Team:  No Ref-Primary, Physician as PCP - General    Diagnosis  Patient Active Problem List   Diagnosis Code    Recurrent major depressive disorder, in full remission (H24) F33.42    Generalized anxiety disorder F41.1    Hypothyroidism E03.9    Primary hypertension I10    Depression F32.A       Primary Problem This Admission  Active Hospital Problems    Depression  F32.A      Generalized anxiety disorder  F41.1        Clinical Summary and Substantiation of Recommendations   Pt is recommended for discharge with referral for OP therapy and resources for support groups.  Pt presented to ED with son due to concerns from increased depression and anxiety that are influenced by worsening psychosocial stress. Pt denies SI, SIB and HI. Pt reports passive thoughts of death that she describes as acceptance of death. Pt denies AVH and other psychotic sx. Pt requested referral for OP therapy; referral completed for OP support and referral for Transitions Clinic to bridge care until appointment. Pt engaged in safety planning process by identifying early warning signs, coping skills,  and external supports. Pt and provider are agreeable to discharge care path. No further [facility] tasks needed at this time.          Disposition  Recommended disposition: Individual Therapy        Reviewed case and recommendations with attending provider. Attending Name: Dr. Yordan Coburn       Attending concurs with disposition: yes       Patient and/or validated legal guardian concurs with disposition:   yes       Final disposition:  discharge    Legal status on admission: Voluntary/Patient has signed consent for treatment    Assessment Details   Total duration spent with the patient: 45 min     CPT code(s) utilized: 12738 - Psychotherapy for Crisis - 60 (30-74*) min    Max SYDNI Holloway Psychotherapist  DEC - Triage & Transition Services  Callback: [phone]

## 2024-09-17 NOTE — TELEPHONE ENCOUNTER
Left voicemail for patient stating receive referral. Stated unfortunately do not accept patient insurance, but can call back and get resources if needed, TC number provided.    Priya Hernandez  Transition Clinic Coordinator  09/17/24 8:13 AM

## 2024-09-17 NOTE — DISCHARGE INSTRUCTIONS
"Scheduled Appointment(s):  Type: Teletherapy  Date: Tuesday, 10/1/2024  Time: 10:00 am - 10:53 am  Provider: Yolanda LAINEZ  LICSW  Phone: [phone]  Patient Instructions: Clients will receive an email with consent forms and instructions on how to access the video appointment.    ----------------------    Transition Clinic:  We have completed a referral to the transition clinic for therapy until your appointment.  They will be reaching out to you in the coming days.  You can reach the transition clinic at [phone] to follow up on the status of this referral.         Aftercare Plan  If I am feeling unsafe or I am in a crisis, I will:   Contact my established care providers   Call the National Suicide Prevention Lifeline: 988  Go to the nearest emergency room   Call 911       Crisis Lines  Crisis Text Line  Text 506745  You will be connected with a trained live crisis counselor to provide support.    Por espanol, texto  MAKAYLA a 243481 o texto a 442-AYUDAME en WhatsApp    [employer] (LGBTQ Youth Crisis Line)  1.908.656.0797  text START to 083-467      Community Resources  Fast Tracker  Linking people to mental health and substance use disorder resources  Pharminox.org     [place] Mental Health Warm Line  Peer to peer support  Monday thru Saturday, 12 pm to 10 pm  937.043.5983 or 2.965.644.1619  Text \"Support\" to 12127    National [employer] on Mental Illness (YEIMI)  254.264.8364 or 1.888.YEIMI.HELPS      Mental Health Apps  My3  https://my3app.org/    VirtualHopeBox  https://"[employer].org/apps/virtual-hope-box/      Additional Information  Today you were seen by a licensed mental health professional through Triage and Transition services, [facility])  for a crisis assessment in the Emergency Department at [facility].  It is recommended that you follow up with your established providers (psychiatrist, mental health therapist, and/or primary care " doctor - as relevant) as soon as possible. Coordinators from [facility] will be calling you in the next 24-48 hours to ensure that you have the resources you need.  You can also contact [facility] coordinators directly at [phone]. You may have been scheduled for or offered an appointment with a mental health provider. [facility] maintains an extensive network of licensed behavioral health providers to connect patients with the services they need.  We do not charge providers a fee to participate in our referral network.  We match patients with providers based on a patient's specific needs, insurance coverage, and location.  Our first effort will be to refer you to a provider within your care system, and will utilize providers outside your care system as needed.        [facility] Support Groups/Resources:  Website: https://[facility].org/support/support-groups-for-adults-living-with-a-mental-illness/  Location(s): [facility], [place], [facility], [facility], [place], & [facility].   Phone: ([phone]) [phone]  Email: orville@[facility].org  About: YEIMI Connection is a support group for adults living with mental illness. Groups are free and meet for 90 minutes. All groups are led by trained facilitators who also live with mental illness. Groups provide a welcoming, safe, judgment-free space to share challenges, successes, and learn from one another. There are also groups that focus on LGBTQ+ and BI-POC identities.